# Patient Record
Sex: FEMALE | Race: WHITE
[De-identification: names, ages, dates, MRNs, and addresses within clinical notes are randomized per-mention and may not be internally consistent; named-entity substitution may affect disease eponyms.]

---

## 2019-07-17 ENCOUNTER — HOSPITAL ENCOUNTER (OUTPATIENT)
Dept: HOSPITAL 75 - PREOP | Age: 59
LOS: 1 days | Discharge: HOME | End: 2019-07-18
Attending: SPECIALIST
Payer: COMMERCIAL

## 2019-07-17 VITALS — WEIGHT: 225 LBS | BODY MASS INDEX: 34.1 KG/M2 | HEIGHT: 68 IN

## 2019-07-17 DIAGNOSIS — Z01.818: Primary | ICD-10-CM

## 2019-07-19 ENCOUNTER — HOSPITAL ENCOUNTER (OUTPATIENT)
Dept: HOSPITAL 75 - SDC | Age: 59
Discharge: HOME | End: 2019-07-19
Attending: SPECIALIST
Payer: COMMERCIAL

## 2019-07-19 VITALS — DIASTOLIC BLOOD PRESSURE: 87 MMHG | SYSTOLIC BLOOD PRESSURE: 145 MMHG

## 2019-07-19 VITALS — SYSTOLIC BLOOD PRESSURE: 110 MMHG | DIASTOLIC BLOOD PRESSURE: 95 MMHG

## 2019-07-19 VITALS — HEIGHT: 68 IN | BODY MASS INDEX: 34.1 KG/M2 | WEIGHT: 225 LBS

## 2019-07-19 DIAGNOSIS — Z83.518: ICD-10-CM

## 2019-07-19 DIAGNOSIS — E78.00: ICD-10-CM

## 2019-07-19 DIAGNOSIS — E66.01: ICD-10-CM

## 2019-07-19 DIAGNOSIS — H25.12: Primary | ICD-10-CM

## 2019-07-19 DIAGNOSIS — Z83.3: ICD-10-CM

## 2019-07-19 DIAGNOSIS — Z79.899: ICD-10-CM

## 2019-07-19 RX ADMIN — PHENYLEPHRINE HYDROCHLORIDE SCH ML: 100 SOLUTION/ DROPS OPHTHALMIC at 08:58

## 2019-07-19 RX ADMIN — PHENYLEPHRINE HYDROCHLORIDE SCH ML: 100 SOLUTION/ DROPS OPHTHALMIC at 08:53

## 2019-07-19 RX ADMIN — CYCLOPENTOLATE HYDROCHLORIDE SCH ML: 10 SOLUTION/ DROPS OPHTHALMIC at 08:58

## 2019-07-19 RX ADMIN — TETRACAINE HYDROCHLORIDE PRN ML: 5 SOLUTION OPHTHALMIC at 08:39

## 2019-07-19 RX ADMIN — CYCLOPENTOLATE HYDROCHLORIDE SCH ML: 10 SOLUTION/ DROPS OPHTHALMIC at 08:53

## 2019-07-19 RX ADMIN — PHENYLEPHRINE HYDROCHLORIDE SCH ML: 100 SOLUTION/ DROPS OPHTHALMIC at 08:48

## 2019-07-19 RX ADMIN — TETRACAINE HYDROCHLORIDE PRN ML: 5 SOLUTION OPHTHALMIC at 08:53

## 2019-07-19 RX ADMIN — CYCLOPENTOLATE HYDROCHLORIDE SCH ML: 10 SOLUTION/ DROPS OPHTHALMIC at 08:48

## 2019-07-19 RX ADMIN — TETRACAINE HYDROCHLORIDE PRN ML: 5 SOLUTION OPHTHALMIC at 08:48

## 2019-07-19 RX ADMIN — TETRACAINE HYDROCHLORIDE PRN ML: 5 SOLUTION OPHTHALMIC at 08:58

## 2019-07-19 NOTE — OPHTHALMOLOGY OPERATIVE REPORT
Cataract removal/placement IOL


PREOPERATIVE DIAGNOSIS:    Cataract Left Eye


POSTOPERATIVE DIAGNOSIS: Cataract Left Eye





PROCEDURE: Cataract removal and placement of posterior chamber implant, left eye





SURGEON: Isaias Mcmahon 





ANESTHESIA: Topical with sedation





COMPLICATIONS: None





ESTIMATED BLOOD LOSS: Minimal 





DESCRIPTION OF PROCEDURE:


After proper informed consent was obtained, the patient, a 59 female, was taken 

to the Operating Room and the left eye was anesthetized with tetracaine.  The 

left eye was then prepped and draped in the usual manner.  A wire lid speculum 

was placed. A paracentesis was made at the left hand position. Preservative free

lidocaine was injected into the anterior chamber followed by viscoelastic.  A 

clear corneal incision was made in the temporal position. A capsulorrhexis was 

preformed and the central nuclear and cortical material were removed.  The 

posterior capsule was polished and an Jan 20.0 AU00T0 was placed into the 

capsular bag. The residual viscoelastic was aspirated and balanced saline 

solution was injected into the anterior chamber.  Moxifloxacin was injected into

the anterior chamber.





The wound was checked and found to be water tight.





The patient tolerated the procedure well without complications.











ISAIAS MCMAHON MD             Jul 19, 2019 09:56

## 2019-07-19 NOTE — OPHTHALMOLOGIST PRE-OP NOTE
Pre-Operative Progress Note


H&P Reviewed


The H&P was reviewed, patient examined and no changes noted.


Date H&P Reviewed:  Jul 19, 2019


Time H&P Reviewed:  09:31


Pre-Op Dx


Cataract, Left Eye











SOHAN MCMAHON MD             Jul 19, 2019 09:31

## 2019-07-19 NOTE — ANESTHESIA-GENERAL POST-OP
MAC


Patient Condition


Mental Status/LOC:  Same as Preop


Cardiovascular:  Satisfactory


Nausea/Vomiting:  Absent


Respiratory:  Satisfactory


Pain:  Controlled


Complications:  Absent





Post Op Complications


Complications


None





Follow Up Care/Instructions


Patient Instructions


None needed.





Anesthesiology Discharge Order


Discharge Order


Patient is doing well, no complaints, stable vital signs, no apparent adverse 

anesthesia problems.   


No complications reported per nursing.











LADY NAILS CRNA            Jul 19, 2019 10:31

## 2019-10-30 ENCOUNTER — HOSPITAL ENCOUNTER (OUTPATIENT)
Dept: HOSPITAL 75 - CARD | Age: 59
End: 2019-10-30
Attending: INTERNAL MEDICINE
Payer: COMMERCIAL

## 2019-10-30 VITALS — BODY MASS INDEX: 35.83 KG/M2 | WEIGHT: 233.69 LBS | HEIGHT: 67.72 IN

## 2019-10-30 VITALS — DIASTOLIC BLOOD PRESSURE: 112 MMHG | SYSTOLIC BLOOD PRESSURE: 154 MMHG

## 2019-10-30 VITALS — DIASTOLIC BLOOD PRESSURE: 85 MMHG | SYSTOLIC BLOOD PRESSURE: 127 MMHG

## 2019-10-30 DIAGNOSIS — E78.2: ICD-10-CM

## 2019-10-30 DIAGNOSIS — I25.119: Primary | ICD-10-CM

## 2019-10-30 DIAGNOSIS — E66.9: ICD-10-CM

## 2019-10-30 DIAGNOSIS — I10: ICD-10-CM

## 2019-10-30 PROCEDURE — 78452 HT MUSCLE IMAGE SPECT MULT: CPT

## 2019-10-30 PROCEDURE — 93017 CV STRESS TEST TRACING ONLY: CPT

## 2019-10-30 PROCEDURE — 93306 TTE W/DOPPLER COMPLETE: CPT

## 2019-10-30 NOTE — STRESS TEST
DATE OF SERVICE:  10/30/2019



EXERCISE MYOVIEW STRESS TEST REPORT



REFERRING PHYSICIAN:

_____.



Baseline heart rate is 76.  Baseline blood pressure 127/85.  Baseline EKG is

sinus rhythm with no ischemic changes.



In summary, the patient was injected with 10.03 mCi of technetium-99 Myoview and

the resting images were obtained.  Then, the patient started exercising with a

baseline heart rate, blood pressure and EKG mentioned above.  The patient was

able to exercise for 5 minutes 45 seconds on standard Ricky protocol.  With peak

exercise level, EKG was showing minimal nondiagnostic changes.  During recovery,

heart rate and blood pressure returned to baseline.  EKG returned to baseline.



The resting and stress images were reviewed and compared in the short axis,

horizontal long axis, and vertical long axis views.  Review of the images showed

breast attenuation with no significant ischemia or infarction.  There is mild

decreased uptake at the basal to mid anterolateral wall.  SSS is 4, SDS 3, TID

value 0.82.  On the gated images, the left ventricle appeared to be normal size

with normal contractility.  Calculated ejection fraction 65%.



CONCLUSION:

1.  Fair exercise tolerance, a total of 5 minutes 40 seconds on a standard Ricky

protocol, total of 7.1 METs achieving 88% of maximum expected heart rate.

2.  Hypertensive response to exercise with peak blood pressure 181/116, returned

to baseline during recovery.

3.  Nondiagnostic EKG changes with exercise returned to baseline during

recovery.

4.  Breast attenuation with typical female pattern with no significant ischemia

or infarction.

5.  Normal left ventricular size with normal contractility.  Calculated ejection

fraction 65%.





Job ID: [id number]

DocumentID: [id number]

Dictated Date:  10/30/2019 15:04:56

Transcription Date: 10/30/2019 17:30:59

Dictated By: DONNELL MORA MD

## 2019-11-14 ENCOUNTER — HOSPITAL ENCOUNTER (OUTPATIENT)
Dept: HOSPITAL 75 - PREOP | Age: 59
Discharge: HOME | End: 2019-11-14
Attending: INTERNAL MEDICINE
Payer: COMMERCIAL

## 2019-11-14 DIAGNOSIS — Z01.818: Primary | ICD-10-CM

## 2023-06-14 ENCOUNTER — HOSPITAL ENCOUNTER (OUTPATIENT)
Dept: HOSPITAL 75 - RAD | Age: 63
End: 2023-06-14
Attending: PHYSICIAN ASSISTANT
Payer: COMMERCIAL

## 2023-06-14 DIAGNOSIS — R06.02: ICD-10-CM

## 2023-06-14 DIAGNOSIS — M47.816: Primary | ICD-10-CM

## 2023-06-14 PROCEDURE — 71046 X-RAY EXAM CHEST 2 VIEWS: CPT

## 2023-06-14 PROCEDURE — 72100 X-RAY EXAM L-S SPINE 2/3 VWS: CPT

## 2023-06-14 NOTE — DIAGNOSTIC IMAGING REPORT
INDICATION: SHORTNESS OF BREATH



COMPARISON: 11/18/2016



FINDINGS: Frontal and lateral views of the chest demonstrate

normal heart size and pulmonary vascularity. The lungs are clear.

There are no signs of infiltrate, pleural effusions or

pneumothoraces. The visualized osseous structures show no acute

abnormalities.



IMPRESSION: 

1. No acute process. No signs of infiltrates, effusions or

pneumothoraces.



Dictated by: 



  Dictated on workstation # [id number]

## 2023-06-14 NOTE — DIAGNOSTIC IMAGING REPORT
INDICATION: Back pain.



COMPARISON: None



FINDINGS: Frontal and lateral views of the lumbar spine were

obtained. There is mild levoscoliotic deformity at the

thoracolumbar junction. AP static alignment is maintained.

Vertebral body heights are preserved. There is no fracture or

destructive process. Multilevel degenerative disease is noted in

the lumbar spine. Limited views of the abdomen demonstrate

nonobstructive bowel gas pattern. Right renal calculi are

suspected.



IMPRESSION: 

1. No acute fracture or dislocation of the lumbar spine.

2. multilevel degenerative changes.

3. Probable right renal calculi.



Dictated by: 



  Dictated on workstation # [id number]

## 2023-08-01 ENCOUNTER — HOSPITAL ENCOUNTER (OUTPATIENT)
Dept: HOSPITAL 75 - RAD | Age: 63
End: 2023-08-01
Attending: FAMILY MEDICINE
Payer: COMMERCIAL

## 2023-08-01 DIAGNOSIS — Z12.31: Primary | ICD-10-CM

## 2023-08-01 PROCEDURE — 77067 SCR MAMMO BI INCL CAD: CPT

## 2023-08-01 PROCEDURE — 77063 BREAST TOMOSYNTHESIS BI: CPT

## 2023-08-01 NOTE — DIAGNOSTIC IMAGING REPORT
INDICATION: 

Routine screening.



COMPARISON: 

No prior mammograms are available for comparison.



TECHNIQUE: 

2D and 3D bilateral screening mammography was performed with CAD.



FINDINGS:

Both breasts are heterogeneously dense, limiting the sensitivity

of mammography. No dominant mass or malignant-appearing

microcalcifications are identified. The axillae are unremarkable.



IMPRESSION: 

No mammographic features suspicious for malignancy are

identified.



ACR BI-RADS Category 1: Negative.

Result letter will be mailed to the patient.

Note:  At least 10% of breast cancer is not imaged by

mammography.



Dictated by: 



  Dictated on workstation # [id number]

## 2023-09-13 ENCOUNTER — HOSPITAL ENCOUNTER (OUTPATIENT)
Dept: HOSPITAL 75 - CARD | Age: 63
End: 2023-09-13
Attending: INTERNAL MEDICINE
Payer: COMMERCIAL

## 2023-09-13 VITALS — SYSTOLIC BLOOD PRESSURE: 144 MMHG | DIASTOLIC BLOOD PRESSURE: 81 MMHG

## 2023-09-13 DIAGNOSIS — R07.9: Primary | ICD-10-CM

## 2023-09-13 PROCEDURE — 78452 HT MUSCLE IMAGE SPECT MULT: CPT

## 2023-09-13 PROCEDURE — 93017 CV STRESS TEST TRACING ONLY: CPT

## 2023-09-13 NOTE — CARDIOLOGY STRESS TEST REPORT
Stress Test Report


Date of Procedure/Referring:


Date of Procedure:  Sep 13, 2023


PCP


Rachel Araujo MD


Admitting Physician


Admitting Physician:


 








Attending Physician:


Malathi Patino MD





Baseline Heart Rate:


72





Baseline Blood Pressure:


Blood Pressure Systolic:  144


Blood Pressure Diastolic:  81


Baseline Vitals





Vital Signs








  Date Time  Temp Pulse Resp B/P (MAP) Pulse Ox O2 Delivery O2 Flow Rate FiO2


 


9/13/23 09:31  76  144/81 (102)    











Baseline EKG:


Baseline EKG:  NSR





Summary


After explaining the procedure to the patient, she  signed a consent and then 

brought to the stress nuclear laboratory.


Patient received 0.4 mg Lexiscan for stress test, ECG, heart rate and blood 

pressure were monitored continuously.  Resting and stress dose of radio tracer 

were injected, imaging was acquired and reviewed in short axis, horizontal long 

axis and vertical long axis views.


TID:  1.23


SSS:  2


SDS:  2


EF:  70


Patient tolerated Lexiscan well


No ischemia or infarction was noted on SPECT images


Transient ischemic dilatation is mildly increased 1.23


Normal left ventricular size, ejection fraction 70%





Copy


Copies To 1:   RACHEL ARAUJO MD, BASHAR J MD              Sep 13, 2023 11:55

## 2023-10-04 ENCOUNTER — HOSPITAL ENCOUNTER (OUTPATIENT)
Dept: HOSPITAL 75 - CATH | Age: 63
End: 2023-10-04
Attending: INTERNAL MEDICINE
Payer: COMMERCIAL

## 2023-10-04 VITALS — SYSTOLIC BLOOD PRESSURE: 118 MMHG | DIASTOLIC BLOOD PRESSURE: 72 MMHG

## 2023-10-04 VITALS — HEIGHT: 67.99 IN | BODY MASS INDEX: 39.26 KG/M2 | WEIGHT: 259.04 LBS

## 2023-10-04 VITALS — DIASTOLIC BLOOD PRESSURE: 66 MMHG | SYSTOLIC BLOOD PRESSURE: 114 MMHG

## 2023-10-04 VITALS — DIASTOLIC BLOOD PRESSURE: 67 MMHG | SYSTOLIC BLOOD PRESSURE: 122 MMHG

## 2023-10-04 VITALS — DIASTOLIC BLOOD PRESSURE: 72 MMHG | SYSTOLIC BLOOD PRESSURE: 127 MMHG

## 2023-10-04 VITALS — SYSTOLIC BLOOD PRESSURE: 120 MMHG | DIASTOLIC BLOOD PRESSURE: 72 MMHG

## 2023-10-04 VITALS — DIASTOLIC BLOOD PRESSURE: 111 MMHG | SYSTOLIC BLOOD PRESSURE: 153 MMHG

## 2023-10-04 VITALS — SYSTOLIC BLOOD PRESSURE: 113 MMHG | DIASTOLIC BLOOD PRESSURE: 72 MMHG

## 2023-10-04 VITALS — DIASTOLIC BLOOD PRESSURE: 78 MMHG | SYSTOLIC BLOOD PRESSURE: 100 MMHG

## 2023-10-04 DIAGNOSIS — E78.2: ICD-10-CM

## 2023-10-04 DIAGNOSIS — R94.39: ICD-10-CM

## 2023-10-04 DIAGNOSIS — R06.09: ICD-10-CM

## 2023-10-04 DIAGNOSIS — I65.23: ICD-10-CM

## 2023-10-04 DIAGNOSIS — G47.33: ICD-10-CM

## 2023-10-04 DIAGNOSIS — R07.89: ICD-10-CM

## 2023-10-04 DIAGNOSIS — I25.10: Primary | ICD-10-CM

## 2023-10-04 DIAGNOSIS — Z99.81: ICD-10-CM

## 2023-10-04 DIAGNOSIS — F41.9: ICD-10-CM

## 2023-10-04 DIAGNOSIS — E66.9: ICD-10-CM

## 2023-10-04 DIAGNOSIS — Z79.899: ICD-10-CM

## 2023-10-04 DIAGNOSIS — R00.0: ICD-10-CM

## 2023-10-04 DIAGNOSIS — R20.0: ICD-10-CM

## 2023-10-04 DIAGNOSIS — I10: ICD-10-CM

## 2023-10-04 LAB
ALBUMIN SERPL-MCNC: 4.2 GM/DL (ref 3.2–4.5)
ALP SERPL-CCNC: 78 U/L (ref 40–136)
ALT SERPL-CCNC: 39 U/L (ref 0–55)
APTT BLD: 31 SEC (ref 24–35)
APTT PPP: YELLOW S
BACTERIA #/AREA URNS HPF: NEGATIVE /HPF
BILIRUB SERPL-MCNC: 0.6 MG/DL (ref 0.1–1)
BILIRUB UR QL STRIP: NEGATIVE
BUN/CREAT SERPL: 19
CALCIUM SERPL-MCNC: 9 MG/DL (ref 8.5–10.1)
CHLORIDE SERPL-SCNC: 110 MMOL/L (ref 98–107)
CHOLEST SERPL-MCNC: 253 MG/DL (ref ?–200)
CO2 SERPL-SCNC: 21 MMOL/L (ref 21–32)
CREAT SERPL-MCNC: 0.85 MG/DL (ref 0.6–1.3)
FIBRINOGEN PPP-MCNC: CLEAR MG/DL
GFR SERPLBLD BASED ON 1.73 SQ M-ARVRAT: 76 ML/MIN
GLUCOSE SERPL-MCNC: 120 MG/DL (ref 70–105)
GLUCOSE UR STRIP-MCNC: NEGATIVE MG/DL
HCT VFR BLD CALC: 47 % (ref 35–52)
HDLC SERPL-MCNC: 54 MG/DL (ref 40–60)
HGB BLD-MCNC: 15.4 G/DL (ref 11.5–16)
INR PPP: 0.9 (ref 0.8–1.4)
KETONES UR QL STRIP: NEGATIVE
LEUKOCYTE ESTERASE UR QL STRIP: NEGATIVE
MCH RBC QN AUTO: 31 PG (ref 25–34)
MCHC RBC AUTO-ENTMCNC: 33 G/DL (ref 32–36)
MCV RBC AUTO: 92 FL (ref 80–99)
NITRITE UR QL STRIP: NEGATIVE
PH UR STRIP: 5.5 [PH] (ref 5–9)
PLATELET # BLD: 262 10^3/UL (ref 130–400)
PMV BLD AUTO: 9.6 FL (ref 9–12.2)
POTASSIUM SERPL-SCNC: 3.9 MMOL/L (ref 3.6–5)
PROT SERPL-MCNC: 6.8 GM/DL (ref 6.4–8.2)
PROT UR QL STRIP: NEGATIVE
PROTHROMBIN TIME: 12.4 SEC (ref 12.2–14.7)
RBC #/AREA URNS HPF: (no result) /HPF
SODIUM SERPL-SCNC: 142 MMOL/L (ref 135–145)
SP GR UR STRIP: >=1.03 (ref 1.02–1.02)
SQUAMOUS #/AREA URNS HPF: (no result) /HPF
TRIGL SERPL-MCNC: 102 MG/DL (ref ?–150)
VLDLC SERPL CALC-MCNC: 20 MG/DL (ref 5–40)
WBC # BLD AUTO: 6.8 10^3/UL (ref 4.3–11)
WBC #/AREA URNS HPF: (no result) /HPF

## 2023-10-04 PROCEDURE — 85730 THROMBOPLASTIN TIME PARTIAL: CPT

## 2023-10-04 PROCEDURE — 85610 PROTHROMBIN TIME: CPT

## 2023-10-04 PROCEDURE — 71045 X-RAY EXAM CHEST 1 VIEW: CPT

## 2023-10-04 PROCEDURE — 36221 PLACE CATH THORACIC AORTA: CPT

## 2023-10-04 PROCEDURE — 80053 COMPREHEN METABOLIC PANEL: CPT

## 2023-10-04 PROCEDURE — 36415 COLL VENOUS BLD VENIPUNCTURE: CPT

## 2023-10-04 PROCEDURE — 84443 ASSAY THYROID STIM HORMONE: CPT

## 2023-10-04 PROCEDURE — 93005 ELECTROCARDIOGRAM TRACING: CPT

## 2023-10-04 PROCEDURE — 81000 URINALYSIS NONAUTO W/SCOPE: CPT

## 2023-10-04 PROCEDURE — 93458 L HRT ARTERY/VENTRICLE ANGIO: CPT

## 2023-10-04 PROCEDURE — 80061 LIPID PANEL: CPT

## 2023-10-04 PROCEDURE — 85027 COMPLETE CBC AUTOMATED: CPT

## 2023-10-04 PROCEDURE — 87081 CULTURE SCREEN ONLY: CPT

## 2023-10-04 NOTE — DISCHARGE INST-POST CATH
Discharge Inst-CATH/EP


Problems Reviewed?:  Yes


Post Cardiac Cath/EP D/C Inst


Follow Up/Plan


Appointment with Dr. Patino's office in 2 to 4 weeks


<b>CARDIAC CATH/EP PROCEDURE DISCHARGE INSTRUCTIONS</b>





ACTIVITY





* Go Home directly and rest.


* Limit activity of the leg (or wrist if it was used) for 7 days including aer

obics, swimming,


   jogging, bicycling, etc.


* Restrict stair-climbing for 7 days if possible, if not, climb up with your 

non-cath leg, then


   bring together on the same step.


* Avoid lifting, pushing, pulling or excessive movement of the affected extremi

ty for 7 days.


* Customary sexual activity may be resumed after 2 days-use caution not to use a

position  


   that strains or causes pain to the affected extremity.


* No driving for 24 hours.


* NO SMOKING. 


* Avoid straining for bowel movements for 7 days.


* Gentle walking on level ground is allowed.


* Returning to work will depend on the type of procedure and the results. Your 

doctor will discuss


   this with you.





CALL YOUR DOCTOR FOR ANY OF THE FOLLOWING:





*If bleeding from the puncture site occurs- Apply gentle pressure to site with 

clean cloth and call


   your doctor or EMS.


* If a knot or lump forms under the skin, increases in size, or causes pain.


* If bruising appears to be worsening or moving further down your leg instead of

disappearing.


* Temperature above 101 F.





CARE OF YOUR GROIN INCISION;





* Bruising or purple discoloration of the skin near the puncture site is common.


* You may shower only, no bathtub bathing for 5 days.  Be careful to avoid 

slipping as your


   leg may feel stiff.


* If a closure device was used on your femoral artery, please see the attached 

guide regarding


   care of the device and your leg.


* Leave dressing on FOR 24 hours.





CARE OF YOUR WRIST INCISION;





* Bruising or purple discoloration of the skin near the puncture site is common.


* You may shower.


* DO NOT submerge wrist.


* Leave dressing on FOR 24 hours.











DONNELL PATINO MD               Oct 4, 2023 11:22

## 2023-10-04 NOTE — DIAGNOSTIC IMAGING REPORT
INDICATION:  Shortness of breath, pre-heart catheterization

evaluation  



TECHNIQUE:  Single view chest 7:14 AM



CORRELATION STUDY:  06/14/2023



FINDINGS: 

The heart size, mediastinal configuration and pulmonary

vascularity are within normal limits.  

No infiltrate. Likely minimal perihilar atelectasis.



IMPRESSION: 

1. Negative appearing single view chest.



Dictated by: 



  Dictated on workstation # [id number]

## 2023-10-04 NOTE — CARDIAC PROCEDURE NOTE-CS/ASA
Pre-Procedure Note


Pre-Op Procedure Note


Date of Available H&P:  Sep 14, 2023


Date H&P Reviewed:  Oct 4, 2023


Time H&P Reviewed:  09:27


History & Physical:  H&P Reviewed, Patient Examed, No changes noted


Pre-Operative Diagnosis:  CAD





Moderate Sedation PreProcedure


Time


09:28





ASA Score


3














Airway 


 


Lungs 


 


Heart 


 


 ASA score


 


 ASA 1: a normal healthy patient


 


 ASA 2:  a patient with a mild systemic disease (mid diabetes, controlled 

hypertension, obesity 


 


 ASA 3:  a patient with a severe systemic disease that limits activity  (angina,

COPD, prior Myocardial infarction)


 


 ASA 4:  a patient with an incapacitating disease that is a constant threat to 

life (CHF, renal failure)


 


 ASA 5:  a moribund patient not expected to survive 24 hrs.  (ruptured aneurysm)


 


 ASA 6:  a declared brain-dead patient whose organs are being harvested.


 


 For emergent operations, add the letter E after the classification











Mallampati Classification


Grade 3





Sedation Plan


Analgesia, Amnesia, Plan communicated to team members, Discussed options with 

patient/fam, Discussed risks with patient/fam


The patient is an appropriate candidate to undergo the planned procedure, 

sedation, and anesthesia.





The patient immediately re-assessed prior to indication.











DONNELL MORA MD               Oct 4, 2023 09:28

## 2023-10-04 NOTE — CARDIAC CATH REPORT
Cardiac Cath Report


Physician (s)/Assistant (s)


Physician


DONNELL MORA MD





Pre-Procedure Diagnosis


Pre-Procedure Diagnosis:  CAD





Post-Procedure Note


Procedure Start Date:  Oct 4, 2023


Name of Procedure:  


Left heart catheterization


Aortic arch angiogram


Findings/Procedure Note


PROCEDURE NOTE:


63-year-old lady with history of hypertension, hyperlipidemia, borderline stress

test, has been having recurrent increasing chest pain, cardiac catheterization 

was advised.


After explaining the procedure to the patient, all pros and cons were explained,

all questions were answered.  The patient signed the consent and then she  was 

placed in the cardiac catheterization laboratory. Groin was prepped in SL 

fashion local anesthesia was used. Sheath placed in the right radial artery, 

Tiger catheter was advanced to the left ventricular cavity, pressure was 

measured pullback LV to aorta was done, engage the right and left coronary 

system, angiogram was done then the catheter was pulled back to the aortic arch 

and aortic arch angiogram was done.


At the end of the procedure the sheath was removed.  Vascular band was used





FINDINGS:





Hemodynamics 


/2, end-diastolic pressure of 2


Aorta 105/69 mean of 86





ANATOMY:


Left Main is free of obstructive disease


Left Anterior Descending is moderate in size, smaller artery distally with slow 

flow due to small vessel disease


Left Circumflex is codominant artery with no obstructive disease


Right Coronary Artery is codominant artery with no obstructive disease


LV Gram was not done, pressure was measured


Aorta evaluation done with aortic arch angiogram showing normal aortic arch, no 

dissection or aneurysm, normal origin of the brachiocephalic artery, left 

carotid and left subclavian arteries.





CONCLUSION:


Codominant circumflex and right coronary artery with no obstructive disease


Small LAD distally with small vessel disease slow flow nonobstructive disease


Normal left ventricular end-diastolic pressure


Normal aortic arch and great vessels of the neck





DISCUSSION AND RECOMMENDATION:


Maximizing medical therapy no intervention is warranted


Anesthesia Type:  Conscious Sedation


Estimated blood loss (mL):  10 ml


Contrast Amount:  30 ml


Total Radiation Dose:  453 mGy





Post-Procedure Diagnosis


Post-operative diagnosis:  


Chest pain


Coronary artery disease


Hypertension


Hyperlipidemia











DONNELL MORA MD               Oct 4, 2023 11:25